# Patient Record
Sex: FEMALE | Race: WHITE | NOT HISPANIC OR LATINO | URBAN - METROPOLITAN AREA
[De-identification: names, ages, dates, MRNs, and addresses within clinical notes are randomized per-mention and may not be internally consistent; named-entity substitution may affect disease eponyms.]

---

## 2017-09-08 ENCOUNTER — IMPORTED ENCOUNTER (OUTPATIENT)
Dept: URBAN - METROPOLITAN AREA CLINIC 1 | Facility: CLINIC | Age: 72
End: 2017-09-08

## 2017-09-08 PROBLEM — E11.9: Noted: 2017-09-08

## 2017-09-08 PROBLEM — H35.3131: Noted: 2017-09-08

## 2017-09-08 PROBLEM — H35.372: Noted: 2017-09-08

## 2017-09-08 PROBLEM — Z79.84: Noted: 2017-09-08

## 2017-09-08 PROBLEM — H16.143: Noted: 2017-09-08

## 2017-09-08 PROBLEM — Z96.1: Noted: 2017-09-08

## 2017-09-08 PROBLEM — H26.493: Noted: 2017-09-08

## 2017-09-08 PROBLEM — H04.123: Noted: 2017-09-08

## 2017-09-08 PROCEDURE — 92015 DETERMINE REFRACTIVE STATE: CPT

## 2017-09-08 PROCEDURE — 92014 COMPRE OPH EXAM EST PT 1/>: CPT

## 2017-09-08 NOTE — PATIENT DISCUSSION
1.  DM Type II (Oral Meds) without sign of diabetic retinopathy and no blot heme on dilated retinal examination today OU No Macular Edema:  Discussed the pathophysiology of diabetes and its effect on the eye and risk of blindness. Stressed the importance of strong glucose control. Advised of importance of at least yearly dilated examinations but to contact us immediately for any problems or concerns. 2. ARMD OU Early/dry/stable. Importance of daily AREDS II study multivitamin and Amsler Grid checks discussed with patient. Patient to follow-up immediately with any new onset of decreased vision and/or metamorphopsia. 3. NENA w/ PEK OU- Cont ATs BID OU routinely. 4.  PCO OU- Visually Significant secondary to glare: Schedule YAG Cap OD then OS; pros cons risks and benefits. 5.  Pseudophakia OU  6.  Epiretinal Membrane OS - Stable  Observe for change. Letter to PCP Return for an appointment YAG Cap OD then OS with Dr. Birdie Perla.

## 2017-10-06 ENCOUNTER — IMPORTED ENCOUNTER (OUTPATIENT)
Dept: URBAN - METROPOLITAN AREA CLINIC 1 | Facility: CLINIC | Age: 72
End: 2017-10-06

## 2017-10-06 PROBLEM — H26.491: Noted: 2017-10-06

## 2017-10-06 PROCEDURE — 66821 AFTER CATARACT LASER SURGERY: CPT

## 2017-10-06 NOTE — PATIENT DISCUSSION
YAG CAP OD: (Consent signed and scanned into attachments) 1 gtt Prolensa applied. The purpose and nature of the procedure possible alternative methods of treatment the risks involved and the possibility of complications were discussed with patient. The Patient wishes to proceed and the consent was signed. The laser was then performed under topical anesthesia with no complications. Post op instructions were given to patient as well as a follow-up appointment. Patient was advised to call our office if any questions or concerns. Return for an appointment for Return as scheduled with Dr. Sarah Woodruff.

## 2017-10-20 ENCOUNTER — IMPORTED ENCOUNTER (OUTPATIENT)
Dept: URBAN - METROPOLITAN AREA CLINIC 1 | Facility: CLINIC | Age: 72
End: 2017-10-20

## 2017-10-20 PROCEDURE — 66821 AFTER CATARACT LASER SURGERY: CPT

## 2017-10-20 NOTE — PATIENT DISCUSSION
YAG CAP OS: (Consent signed and scanned into attachments) 1 gtt Prolensa applied. The purpose and nature of the procedure possible alternative methods of treatment the risks involved and the possibility of complications were discussed with patient. The Patient wishes to proceed and the consent was signed. The laser was then performed under topical anesthesia with no complications. Post op instructions were given to patient as well as a follow-up appointment. Patient was advised to call our office if any questions or concerns.  RTC 1-6 week YAG PO

## 2017-10-25 PROBLEM — H26.492: Noted: 2017-10-25

## 2017-12-04 ENCOUNTER — IMPORTED ENCOUNTER (OUTPATIENT)
Dept: URBAN - METROPOLITAN AREA CLINIC 1 | Facility: CLINIC | Age: 72
End: 2017-12-04

## 2017-12-04 PROCEDURE — 99024 POSTOP FOLLOW-UP VISIT: CPT

## 2017-12-04 NOTE — PATIENT DISCUSSION
PO YAG Cap OU: good result. MRX given. Return for an appointment for a dfe in April with Dr. Tristan Lewis.

## 2018-04-05 ENCOUNTER — IMPORTED ENCOUNTER (OUTPATIENT)
Dept: URBAN - METROPOLITAN AREA CLINIC 1 | Facility: CLINIC | Age: 73
End: 2018-04-05

## 2018-04-05 PROBLEM — H35.3132: Noted: 2018-04-05

## 2018-04-05 PROBLEM — H04.123: Noted: 2018-04-05

## 2018-04-05 PROBLEM — E11.9: Noted: 2018-04-05

## 2018-04-05 PROBLEM — H43.813: Noted: 2018-04-05

## 2018-04-05 PROBLEM — Z79.84: Noted: 2018-04-05

## 2018-04-05 PROBLEM — H16.143: Noted: 2018-04-05

## 2018-04-05 PROBLEM — H35.372: Noted: 2018-04-05

## 2018-04-05 PROCEDURE — 92014 COMPRE OPH EXAM EST PT 1/>: CPT

## 2018-04-05 NOTE — PATIENT DISCUSSION
1.  DM Type II (Oral Meds) without sign of diabetic retinopathy and no blot heme on dilated retinal examination today OU No Macular Edema:  Discussed the pathophysiology of diabetes and its effect on the eye and risk of blindness. Stressed the importance of strong glucose control. Advised of importance of at least yearly dilated examinations but to contact us immediately for any problems or concerns. 2. ARMD OU Intermediate/dry/stable. Importance of daily AREDS II study multivitamin and Amsler Grid checks discussed with patient. Patient to follow-up immediately with any new onset of decreased vision and/or metamorphopsia. 3. NENA w/ PEK OU- Cont ATs TID OU Routinely. 4.  PVD OU - RD precautions. 5.  Epiretinal Membrane OS - Stable Observe for change. 6. Pseudophakia OU (Standard OU) H/o YAG Cap OUReturn for an appointment in 11 mo 27 with Dr. Geno Dubois.

## 2018-06-14 ENCOUNTER — IMPORTED ENCOUNTER (OUTPATIENT)
Dept: URBAN - METROPOLITAN AREA CLINIC 1 | Facility: CLINIC | Age: 73
End: 2018-06-14

## 2018-06-14 PROBLEM — H35.3112: Noted: 2018-06-14

## 2018-06-14 PROBLEM — H33.21: Noted: 2018-06-14

## 2018-06-14 PROBLEM — H43.811: Noted: 2018-06-14

## 2018-06-14 PROCEDURE — 92012 INTRM OPH EXAM EST PATIENT: CPT

## 2018-06-14 NOTE — PATIENT DISCUSSION
1. Retinal Detachment OD w/ macula on- Condition discussed w/ pt. Patient to see retina specialists today for treatment. 2. PVD w/o Tear OD- RD precautions. 4.  ARMD OD intermediate/dry/stable. Coninue daily AREDS II study multivitamin and Amsler Grid checks discussed with patient. Patient to follow-up immediately with any new onset of decreased vision and/or metamorphopsia. 4.  DM Type II (Oral Meds) without DR OU: Stable. 5. NENA w/ PEK OU- Stable. Cont ATs TID OU Routinely. 6.  H/o Epiretinal Membrane OS7. Pseudophakia OU (Standard OU) H/o YAG Cap OU8. Return as scheduled in October with Dr. Tristan Lewis.

## 2018-06-14 NOTE — PATIENT DISCUSSION
ARMD OD intermediate/dry/stable. Importance of daily AREDS II study multivitamin and Amsler Grid checks discussed with patient. Patient to follow-up immediately with any new onset of decreased vision and/or metamorphopsia.

## 2018-10-05 ENCOUNTER — IMPORTED ENCOUNTER (OUTPATIENT)
Dept: URBAN - METROPOLITAN AREA CLINIC 1 | Facility: CLINIC | Age: 73
End: 2018-10-05

## 2018-10-05 PROBLEM — Z79.84: Noted: 2018-10-05

## 2018-10-05 PROBLEM — H35.372: Noted: 2018-10-05

## 2018-10-05 PROBLEM — H43.813: Noted: 2018-10-05

## 2018-10-05 PROBLEM — H35.3131: Noted: 2018-10-05

## 2018-10-05 PROBLEM — H16.143: Noted: 2018-10-05

## 2018-10-05 PROBLEM — E11.9: Noted: 2018-10-05

## 2018-10-05 PROBLEM — Z96.1: Noted: 2018-10-05

## 2018-10-05 PROBLEM — H04.123: Noted: 2018-10-05

## 2018-10-05 PROCEDURE — 92014 COMPRE OPH EXAM EST PT 1/>: CPT

## 2018-10-05 NOTE — PATIENT DISCUSSION
1.  DM Type II without sign of diabetic retinopathy and no blot heme on dilated retinal examination today OU No Macular Edema: Stable. Discussed the pathophysiology of diabetes and its effect on the eye and risk of blindness. Stressed the importance of strong glucose control. Advised of importance of at least yearly dilated examinations but to contact us immediately for any problems or concerns. 2. Type II diabetes controlled by oral medications. 3.  ARMD OU early/dry/stable. Importance of daily AREDS II study multivitamin and Amsler Grid checks discussed with patient. Patient to follow-up immediately with any new onset of decreased vision and/or metamorphopsia. 4. Epiretinal Membrane OS- Stable. Appears benign. Observe for change. 5. NENA w/ PEK OU- Stable. The continuation of artificial tears were recommended. 6.  PVD OU- Old stable. 7.  Pseudophakia OU - Doing well. H/o Yag OU 8. H/o RD OD- resolved. S/p Cryo/air bubble OD by Dr. Jarrett Crowell. Observe. 9. Return for an appointment for dfe in 6 months with Dr. Nicholas Sharma.

## 2019-04-08 ENCOUNTER — IMPORTED ENCOUNTER (OUTPATIENT)
Dept: URBAN - METROPOLITAN AREA CLINIC 1 | Facility: CLINIC | Age: 74
End: 2019-04-08

## 2019-04-08 PROBLEM — H35.3131: Noted: 2019-04-08

## 2019-04-08 PROBLEM — H04.123: Noted: 2019-04-08

## 2019-04-08 PROBLEM — H43.813: Noted: 2019-04-08

## 2019-04-08 PROBLEM — Z79.84: Noted: 2019-04-08

## 2019-04-08 PROBLEM — E11.9: Noted: 2019-04-08

## 2019-04-08 PROBLEM — H16.143: Noted: 2019-04-08

## 2019-04-08 PROBLEM — Z96.1: Noted: 2019-04-08

## 2019-04-08 PROBLEM — H35.372: Noted: 2019-04-08

## 2019-04-08 PROCEDURE — 99213 OFFICE O/P EST LOW 20 MIN: CPT

## 2019-04-08 NOTE — PATIENT DISCUSSION
1.  ARMD OU Early/dry/stable. Importance of daily AREDS II study multivitamin and Amsler Grid checks discussed with patient. Patient to follow-up immediately with any new onset of decreased vision and/or metamorphopsia. 2. Epiretinal Membrane OS - Observe for change. 3. DM Type II (Oral Medication) without sign of diabetic retinopathy and no blot heme on dilated retinal examination today OU No Macular Edema:  Discussed the pathophysiology of diabetes and its effect on the eye and risk of blindness. Stressed the importance of strong glucose control. Advised of importance of at least yearly dilated examinations but to contact us immediately for any problems or concerns. 4. NENA w/ PEK OU- Recommend ATs TID OU routinely 5. Pseudophakia OU - H/o YAG Cap OU 6. PVD OU - RD precautions. 7.  H/o RD OD- resolved. S/p Cryo/air bubble OD by Dr. William Hatch. Observe. Return for an appointment in 6 months 27 with Dr. Pete Clemons.

## 2019-10-18 ENCOUNTER — IMPORTED ENCOUNTER (OUTPATIENT)
Dept: URBAN - METROPOLITAN AREA CLINIC 1 | Facility: CLINIC | Age: 74
End: 2019-10-18

## 2019-10-18 PROBLEM — E11.9: Noted: 2019-10-18

## 2019-10-18 PROBLEM — H35.3132: Noted: 2019-10-18

## 2019-10-18 PROBLEM — H43.813: Noted: 2019-10-18

## 2019-10-18 PROBLEM — Z96.1: Noted: 2019-10-18

## 2019-10-18 PROBLEM — H16.143: Noted: 2019-10-18

## 2019-10-18 PROBLEM — Z79.84: Noted: 2019-10-18

## 2019-10-18 PROBLEM — H04.123: Noted: 2019-10-18

## 2019-10-18 PROBLEM — H35.372: Noted: 2019-10-18

## 2019-10-18 PROCEDURE — 92014 COMPRE OPH EXAM EST PT 1/>: CPT

## 2019-10-18 NOTE — PATIENT DISCUSSION
1.  DM Type II (Oral Medication) without sign of diabetic retinopathy and no blot heme on dilated retinal examination today OU No Macular Edema:  Discussed the pathophysiology of diabetes and its effect on the eye and risk of blindness. Stressed the importance of strong glucose control. Advised of importance of at least yearly dilated examinations but to contact us immediately for any problems or concerns. 2. ARMD OU Intermediate/dry/stable. Importance of daily AREDS II study multivitamin and Amsler Grid checks discussed with patient. Patient to follow-up immediately with any new onset of decreased vision and/or metamorphopsia. 3. Epiretinal Membrane OS - Observe for change. 4. NENA w/ PEK OU-The use/continuation of artificial tears were recommended. 5.  Pseudophakia OU - H/o YAG Cap OU 6. PVD OU - RD precautions. Patient defers the refraction at today's visitReturn for an appointment in 6 months 10/DFE with Dr. Maine Drew.

## 2019-10-18 NOTE — PATIENT DISCUSSION
PVD with Tear OU-Plan for Retinopexy. Discussed the risks benefits alternatives and limitations of Retinopexy. The patient stated a full understanding and a desire to proceed with the procedure.

## 2021-06-25 ENCOUNTER — IMPORTED ENCOUNTER (OUTPATIENT)
Dept: URBAN - METROPOLITAN AREA CLINIC 1 | Facility: CLINIC | Age: 76
End: 2021-06-25

## 2021-06-25 PROBLEM — H16.143: Noted: 2021-06-25

## 2021-06-25 PROBLEM — Z79.84: Noted: 2021-06-25

## 2021-06-25 PROBLEM — H35.3131: Noted: 2021-06-25

## 2021-06-25 PROBLEM — E11.9: Noted: 2021-06-25

## 2021-06-25 PROBLEM — H04.123: Noted: 2021-06-25

## 2021-06-25 PROCEDURE — 99214 OFFICE O/P EST MOD 30 MIN: CPT

## 2021-06-25 NOTE — PATIENT DISCUSSION
1.  DM Type II (Oral Medication) without sign of diabetic retinopathy and no blot heme on dilated retinal examination today OU No Macular Edema:  Discussed the pathophysiology of diabetes and its effect on the eye and risk of blindness. Stressed the importance of strong glucose control. Advised of importance of at least yearly dilated examinations but to contact us immediately for any problems or concerns. 2. ARMD OU Intermediate/dry/stable. Importance of daily AREDS II study multivitamin and Amsler Grid checks discussed with patient. Patient to follow-up immediately with any new onset of decreased vision and/or metamorphopsia. 3. Epiretinal Membrane OS - Observe for change. 4. NENA w/ PEK OU-The use/continuation of artificial tears were recommended. 5.  Pseudophakia OU - H/o YAG Cap OU 6. PVD OU - RD precautions. 7.  H/o RD OD- resolved. S/p Cryo/air bubble OD by Dr. Jenn Houser. Observe. Return for an appointment in 6 months 10/DFE with Dr. Zenobia Hilton.

## 2022-02-28 ENCOUNTER — COMPREHENSIVE EXAM (OUTPATIENT)
Dept: URBAN - METROPOLITAN AREA CLINIC 1 | Facility: CLINIC | Age: 77
End: 2022-02-28

## 2022-02-28 DIAGNOSIS — H35.3132: ICD-10-CM

## 2022-02-28 PROCEDURE — 92012 INTRM OPH EXAM EST PATIENT: CPT

## 2022-02-28 ASSESSMENT — TONOMETRY
OD_IOP_MMHG: 16
OS_IOP_MMHG: 16

## 2022-02-28 ASSESSMENT — VISUAL ACUITY
OS_SC: 20/20
OD_SC: 20/20

## 2022-04-02 ASSESSMENT — TONOMETRY
OD_IOP_MMHG: 16
OS_IOP_MMHG: 17
OS_IOP_MMHG: 16
OS_IOP_MMHG: 17
OD_IOP_MMHG: 17
OS_IOP_MMHG: 16
OD_IOP_MMHG: 17
OS_IOP_MMHG: 15
OD_IOP_MMHG: 16
OD_IOP_MMHG: 16
OS_IOP_MMHG: 16
OD_IOP_MMHG: 16
OD_IOP_MMHG: 17
OD_IOP_MMHG: 16
OS_IOP_MMHG: 16
OS_IOP_MMHG: 17

## 2022-04-02 ASSESSMENT — KERATOMETRY
OD_AXISANGLE2_DEGREES: 111
OS_AXISANGLE_DEGREES: 176
OD_K1POWER_DIOPTERS: 42.25
OD_K2POWER_DIOPTERS: 42.50
OS_K1POWER_DIOPTERS: 42.50
OS_K2POWER_DIOPTERS: 43.50
OD_AXISANGLE_DEGREES: 021
OS_AXISANGLE2_DEGREES: 086

## 2022-04-02 ASSESSMENT — VISUAL ACUITY
OD_CC: 20/20
OS_CC: 20/20
OS_CC: 20/20
OD_CC: J2
OS_CC: 20/20
OD_CC: 20/20
OS_CC: 20/20
OS_CC: 20/20
OD_CC: 20/20
OS_CC: 20/20
OD_CC: 20/20
OD_GLARE: 20/50
OS_GLARE: 20/50
OS_CC: J2
OS_CC: 20/20
OD_CC: 20/20
OS_CC: 20/20
OD_CC: 20/20

## 2022-08-29 ENCOUNTER — COMPREHENSIVE EXAM (OUTPATIENT)
Dept: URBAN - METROPOLITAN AREA CLINIC 1 | Facility: CLINIC | Age: 77
End: 2022-08-29

## 2022-08-29 DIAGNOSIS — H35.373: ICD-10-CM

## 2022-08-29 DIAGNOSIS — H35.3132: ICD-10-CM

## 2022-08-29 DIAGNOSIS — E11.9: ICD-10-CM

## 2022-08-29 PROCEDURE — 99214 OFFICE O/P EST MOD 30 MIN: CPT

## 2022-08-29 ASSESSMENT — TONOMETRY
OD_IOP_MMHG: 16
OS_IOP_MMHG: 16

## 2022-08-29 ASSESSMENT — VISUAL ACUITY
OS_SC: 20/20
OD_SC: 20/20

## 2023-06-08 ENCOUNTER — FOLLOW UP (OUTPATIENT)
Dept: URBAN - METROPOLITAN AREA CLINIC 1 | Facility: CLINIC | Age: 78
End: 2023-06-08

## 2023-06-08 DIAGNOSIS — H35.373: ICD-10-CM

## 2023-06-08 DIAGNOSIS — H35.3132: ICD-10-CM

## 2023-06-08 PROCEDURE — 92134 CPTRZ OPH DX IMG PST SGM RTA: CPT

## 2023-06-08 PROCEDURE — 99213 OFFICE O/P EST LOW 20 MIN: CPT

## 2023-06-08 ASSESSMENT — TONOMETRY
OS_IOP_MMHG: 15
OD_IOP_MMHG: 15

## 2023-06-08 ASSESSMENT — VISUAL ACUITY
OD_CC: J1
OD_CC: 20/25-1
OS_CC: J1
OS_CC: 20/25-1

## 2024-09-04 NOTE — PATIENT DISCUSSION
NENA w/ PEK OU-The use/continuation of artificial tears were recommended. Show Applicator Variable?: Yes Show Aperture Variable?: No Application Tool (Optional): Cry-AC Post-Care Instructions: We reviewed with the patient in detail post-care instructions. Patient is to wear sunprotection, and avoid picking at any of the treated lesions. Pt may apply Vaseline to crusted or scabbing areas. Detail Level: Detailed Duration Of Freeze Thaw-Cycle (Seconds): 0 Consent: The patient's consent was obtained including but not limited to risks of crusting, scabbing,, recurrence, incomplete removal and infection.